# Patient Record
Sex: FEMALE | Race: WHITE | NOT HISPANIC OR LATINO
[De-identification: names, ages, dates, MRNs, and addresses within clinical notes are randomized per-mention and may not be internally consistent; named-entity substitution may affect disease eponyms.]

---

## 2021-01-06 ENCOUNTER — APPOINTMENT (OUTPATIENT)
Dept: OTOLARYNGOLOGY | Facility: CLINIC | Age: 66
End: 2021-01-06
Payer: COMMERCIAL

## 2021-01-06 VITALS
TEMPERATURE: 97.8 F | HEART RATE: 84 BPM | DIASTOLIC BLOOD PRESSURE: 47 MMHG | SYSTOLIC BLOOD PRESSURE: 99 MMHG | HEIGHT: 64 IN | OXYGEN SATURATION: 95 %

## 2021-01-06 DIAGNOSIS — Z85.3 PERSONAL HISTORY OF MALIGNANT NEOPLASM OF BREAST: ICD-10-CM

## 2021-01-06 DIAGNOSIS — Z80.9 FAMILY HISTORY OF MALIGNANT NEOPLASM, UNSPECIFIED: ICD-10-CM

## 2021-01-06 DIAGNOSIS — H61.21 IMPACTED CERUMEN, RIGHT EAR: ICD-10-CM

## 2021-01-06 DIAGNOSIS — H93.92 UNSPECIFIED DISORDER OF LEFT EAR: ICD-10-CM

## 2021-01-06 DIAGNOSIS — H91.22 SUDDEN IDIOPATHIC HEARING LOSS, LEFT EAR: ICD-10-CM

## 2021-01-06 DIAGNOSIS — Z82.49 FAMILY HISTORY OF ISCHEMIC HEART DISEASE AND OTHER DISEASES OF THE CIRCULATORY SYSTEM: ICD-10-CM

## 2021-01-06 PROCEDURE — 99072 ADDL SUPL MATRL&STAF TM PHE: CPT

## 2021-01-06 PROCEDURE — G0268 REMOVAL OF IMPACTED WAX MD: CPT

## 2021-01-06 PROCEDURE — 92550 TYMPANOMETRY & REFLEX THRESH: CPT

## 2021-01-06 PROCEDURE — 92557 COMPREHENSIVE HEARING TEST: CPT

## 2021-01-06 PROCEDURE — 99203 OFFICE O/P NEW LOW 30 MIN: CPT | Mod: 25

## 2021-01-06 NOTE — HISTORY OF PRESENT ILLNESS
[de-identified] : 66 yo woman with l continuous mild l hissing tinnitus for 3 weeks. She denies noticeable hl. She saw her pmd Dr Cárdenas - who she said told her he saw some congestion in her ear and he recommended Dymista and Allegra-D. She tried these but they did not help. One week prior she went to urgent care; they saw wax in her left ear and irrigated it and told her it was completely removed but the problem persisted. She denies vertigo. H/o lung ca. No fh relevant to cc. No inciting event or uri.

## 2021-01-06 NOTE — PHYSICAL EXAM
[de-identified] : r copious cerumen impaction removed atraumatically with suction; l normal [Midline] : trachea located in midline position [Normal] : no rashes [de-identified] : gait steady

## 2021-01-06 NOTE — PROCEDURE
[Cerumen Impaction] : Cerumen Impaction [Same] : same as the Pre Op Dx. [] : Removal of Cerumen [FreeTextEntry6] : r copious cerumen impaction removed atraumatically with suction

## 2021-01-15 ENCOUNTER — APPOINTMENT (OUTPATIENT)
Dept: OTOLARYNGOLOGY | Facility: CLINIC | Age: 66
End: 2021-01-15
Payer: COMMERCIAL

## 2021-01-15 VITALS
TEMPERATURE: 98.4 F | HEART RATE: 64 BPM | SYSTOLIC BLOOD PRESSURE: 131 MMHG | DIASTOLIC BLOOD PRESSURE: 79 MMHG | OXYGEN SATURATION: 99 %

## 2021-01-15 PROCEDURE — 99213 OFFICE O/P EST LOW 20 MIN: CPT

## 2021-01-15 PROCEDURE — 92553 AUDIOMETRY AIR & BONE: CPT

## 2021-01-15 PROCEDURE — 99072 ADDL SUPL MATRL&STAF TM PHE: CPT

## 2021-01-15 NOTE — DATA REVIEWED
[de-identified] : repeat  l minimally asymm hf hearing levels without real loss; reviewed with pt and compared with last week - no change [de-identified] : mri reviewed with pt - normal x deviated septum

## 2021-03-05 ENCOUNTER — APPOINTMENT (OUTPATIENT)
Dept: OTOLARYNGOLOGY | Facility: CLINIC | Age: 66
End: 2021-03-05
Payer: COMMERCIAL

## 2021-03-05 VITALS
TEMPERATURE: 98.4 F | SYSTOLIC BLOOD PRESSURE: 88 MMHG | HEART RATE: 63 BPM | BODY MASS INDEX: 22.2 KG/M2 | HEIGHT: 64 IN | RESPIRATION RATE: 14 BRPM | DIASTOLIC BLOOD PRESSURE: 50 MMHG | OXYGEN SATURATION: 95 % | WEIGHT: 130 LBS

## 2021-03-05 DIAGNOSIS — H90.42 SENSORINEURAL HEARING LOSS, UNILATERAL, LEFT EAR, WITH UNRESTRICTED HEARING ON THE CONTRALATERAL SIDE: ICD-10-CM

## 2021-03-05 DIAGNOSIS — H93.12 TINNITUS, LEFT EAR: ICD-10-CM

## 2021-03-05 PROCEDURE — 92550 TYMPANOMETRY & REFLEX THRESH: CPT

## 2021-03-05 PROCEDURE — 99213 OFFICE O/P EST LOW 20 MIN: CPT

## 2021-03-05 PROCEDURE — 92700 UNLISTED ORL SERVICE/PX: CPT

## 2021-03-05 PROCEDURE — 99072 ADDL SUPL MATRL&STAF TM PHE: CPT

## 2021-03-05 PROCEDURE — 92557 COMPREHENSIVE HEARING TEST: CPT

## 2021-03-05 NOTE — DATA REVIEWED
[de-identified] : l asymm hf snhl in very high freqs normal up to 8 khz and symmetric reviewed with pt [de-identified] : reviewed mri result (clear) with her

## 2021-03-05 NOTE — HISTORY OF PRESENT ILLNESS
[de-identified] : followup 66 yo woman with left high pitched continuous tinnitus unchanged from last visit in 1/2021. She denies veritgo or hl. She wished to have her hearing rechecked. It does not bother her.

## 2021-09-17 ENCOUNTER — APPOINTMENT (OUTPATIENT)
Dept: OTOLARYNGOLOGY | Facility: CLINIC | Age: 66
End: 2021-09-17

## 2022-01-03 ENCOUNTER — APPOINTMENT (OUTPATIENT)
Dept: OTOLARYNGOLOGY | Facility: CLINIC | Age: 67
End: 2022-01-03
Payer: COMMERCIAL

## 2022-01-03 VITALS
TEMPERATURE: 97.3 F | HEART RATE: 69 BPM | SYSTOLIC BLOOD PRESSURE: 98 MMHG | DIASTOLIC BLOOD PRESSURE: 63 MMHG | OXYGEN SATURATION: 99 %

## 2022-01-03 DIAGNOSIS — R51.9 HEADACHE, UNSPECIFIED: ICD-10-CM

## 2022-01-03 PROCEDURE — 99214 OFFICE O/P EST MOD 30 MIN: CPT | Mod: 25

## 2022-01-03 PROCEDURE — 31231 NASAL ENDOSCOPY DX: CPT | Mod: 52

## 2022-01-03 RX ORDER — ZOLPIDEM TARTRATE 10 MG/1
10 TABLET ORAL
Qty: 30 | Refills: 0 | Status: ACTIVE | COMMUNITY
Start: 2021-08-23

## 2022-01-03 RX ORDER — AZELASTINE HYDROCHLORIDE AND FLUTICASONE PROPIONATE 137; 50 UG/1; UG/1
137-50 SPRAY, METERED NASAL
Qty: 23 | Refills: 0 | Status: ACTIVE | COMMUNITY
Start: 2021-11-15

## 2022-01-03 RX ORDER — NAPROXEN 500 MG/1
500 TABLET ORAL
Qty: 7 | Refills: 0 | Status: ACTIVE | COMMUNITY
Start: 2021-12-08

## 2022-01-03 RX ORDER — GABAPENTIN 100 MG/1
100 CAPSULE ORAL
Qty: 90 | Refills: 0 | Status: ACTIVE | COMMUNITY
Start: 2021-12-15

## 2022-01-03 RX ORDER — CYCLOBENZAPRINE HYDROCHLORIDE 5 MG/1
5 TABLET, FILM COATED ORAL
Qty: 15 | Refills: 0 | Status: ACTIVE | COMMUNITY
Start: 2021-12-13

## 2022-01-03 NOTE — ASSESSMENT
[FreeTextEntry1] : l facial pain (malar)\par tmj vs sinusitis vs trigeminal problem - she was offered gabapentin by her md\par mri ordered\par rtc with mri\par brain mri from 1 yr ago ok\par I wished to try a course of abx - she would not allow it\par flonase, sudafed

## 2022-01-03 NOTE — PROCEDURE
[Posterior Lesion] : posterior lesion [Anterior rhinoscopy insufficient to account for symptoms] : anterior rhinoscopy insufficient to account for symptoms [Flexible Endoscope] : examined with the flexible endoscope [Normal] : the paranasal sinuses had no abnormalities [Serial Number: ___] : Serial Number: [unfilled] [FreeTextEntry6] : done to see whether there is matl draining from omu; sinus infx causing sx\par clear

## 2022-01-03 NOTE — PHYSICAL EXAM
[Nasal Endoscopy Performed] : nasal endoscopy was performed, see procedure section for findings [Midline] : trachea located in midline position [Normal] : no rashes [de-identified] : l>r

## 2022-01-03 NOTE — HISTORY OF PRESENT ILLNESS
[de-identified] : l posterior tooth pain for a month -- saw a dentist and told teeth are all ok. Tried nsaids no help gabapentin prescribed but did not want to take it. Felt like she had been punched in the cheek. She ruled out out sinuses with her pmd by his looking at her nose. feels like a toothache

## 2022-01-07 ENCOUNTER — APPOINTMENT (OUTPATIENT)
Dept: OTOLARYNGOLOGY | Facility: CLINIC | Age: 67
End: 2022-01-07
Payer: COMMERCIAL

## 2022-01-07 VITALS
TEMPERATURE: 98.8 F | HEIGHT: 63.5 IN | SYSTOLIC BLOOD PRESSURE: 106 MMHG | BODY MASS INDEX: 22.23 KG/M2 | OXYGEN SATURATION: 97 % | HEART RATE: 91 BPM | RESPIRATION RATE: 17 BRPM | DIASTOLIC BLOOD PRESSURE: 69 MMHG | WEIGHT: 127 LBS

## 2022-01-07 DIAGNOSIS — R51.9 HEADACHE, UNSPECIFIED: ICD-10-CM

## 2022-01-07 PROCEDURE — 99213 OFFICE O/P EST LOW 20 MIN: CPT

## 2022-01-07 NOTE — ASSESSMENT
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

## 2022-01-07 NOTE — HISTORY OF PRESENT ILLNESS
[de-identified] : 65 y/o F with L malar pain for about a month. She has had extensive dental work for a l posterior tooth problem by 3 dentists. She is here today for 4 day followup to review MRI. She admits to grinding her teeth and increased stress recently. No new ENT complaints at this time.

## 2022-01-07 NOTE — DATA REVIEWED
[de-identified] : MRI showed R synovial cyst on TMJ, nl age related changes, possible arachnoid cyst, images and results reviewed with pt

## 2022-02-28 NOTE — HISTORY OF PRESENT ILLNESS
Pharmacy requesting refill rx    liothyronine (CYTOMEL) 50 MCG tablet          Possible duplicate: Noemi to review recent actions on this medication    Sig: Take 1 tablet by mouth daily.    Disp:  30 tablet    Refills:  0    Start: 2/25/2022    Class: Eprescribe    Non-formulary    To pharmacy: Dose increased.    Last ordered: 1 week ago by Eliazar Chen MD Last dispensed: 2/16/2022    Rx #: [id number]    Pharmacy comment: PLEASE REFILL FOR 30 DAYS, PATIENT ONLY HAS PARTIAL REFILL LEFT AND HER BUBBLE PACKS ARE DUE TO BE FILLED 03/02       To be filled at: [id number] - [place], [place] - [address]         Patient last seen on 1/17/22 with advised 1 month follow-up, and next appt not yet made. Reminder in RF to contact the office.   Medication refilled per protocol.   [de-identified] : followup 66 yo woman with left high pitched continuous tinnitus. she also has tenderness of the auricle and tmj, no swelling. she clenches her teeth frequently. She has h/o lung cancer and new lesions were found recently which she says will be watched. She had mri and is here to review and discuss treatment. She has indicated she does not want IT steroids today and had a lot of side effects in the past from po. She is due to get her covid-19 vaccine in 3days.

## 2022-07-20 ENCOUNTER — APPOINTMENT (OUTPATIENT)
Dept: OTOLARYNGOLOGY | Facility: CLINIC | Age: 67
End: 2022-07-20

## 2022-07-20 VITALS
TEMPERATURE: 97.6 F | SYSTOLIC BLOOD PRESSURE: 91 MMHG | HEART RATE: 86 BPM | DIASTOLIC BLOOD PRESSURE: 52 MMHG | RESPIRATION RATE: 14 BRPM | OXYGEN SATURATION: 96 %

## 2022-07-20 DIAGNOSIS — H69.80 OTHER SPECIFIED DISORDERS OF EUSTACHIAN TUBE, UNSPECIFIED EAR: ICD-10-CM

## 2022-07-20 DIAGNOSIS — H91.90 UNSPECIFIED HEARING LOSS, UNSPECIFIED EAR: ICD-10-CM

## 2022-07-20 DIAGNOSIS — H92.03 OTALGIA, BILATERAL: ICD-10-CM

## 2022-07-20 PROCEDURE — 99213 OFFICE O/P EST LOW 20 MIN: CPT | Mod: 25

## 2022-07-20 PROCEDURE — 31231 NASAL ENDOSCOPY DX: CPT | Mod: 52

## 2022-07-20 NOTE — ASSESSMENT
[FreeTextEntry1] : r > l aural fullness\par h/o tmj\par rec soft diet see dentist wear mouth guard\par \par possible r sudden hl\par I strongly recommended  - she said she did not have time for it today. I recommended she come back with one soon - she said she could come back in a few weeks as she lives in [place]. I recommended she see Dr Stafford or his colleagues there in the interim is she could not come in here and asked staff to give her his contact info.

## 2022-07-20 NOTE — PHYSICAL EXAM
[de-identified] : b [de-identified] : scant cerumen removed [Nasal Endoscopy Performed] : nasal endoscopy was performed, see procedure section for findings [Normal] : no neck adenopathy [de-identified] : gait steady

## 2022-07-20 NOTE — HISTORY OF PRESENT ILLNESS
[de-identified] : 6 mo followup appt for this 65 yo F with b otalgia. She was found to have tmd on mri in January. The pain has recurred and it is bothersome. Denies fsc or drainage. Feels as if something is in ear and that hearing may be decreased in r ear. She cannot stay today for .

## 2022-07-20 NOTE — PROCEDURE
[Posterior Lesion] : posterior lesion [Anterior rhinoscopy insufficient to account for symptoms] : anterior rhinoscopy insufficient to account for symptoms [Flexible Endoscope] : examined with the flexible endoscope [Serial Number: ___] : Serial Number: [unfilled] [Normal] : the paranasal sinuses had no abnormalities [FreeTextEntry6] : done to ro npx mass with referred otalgia\par clear

## 2023-07-20 ENCOUNTER — APPOINTMENT (OUTPATIENT)
Dept: OTOLARYNGOLOGY | Facility: CLINIC | Age: 68
End: 2023-07-20
Payer: COMMERCIAL

## 2023-07-20 VITALS
WEIGHT: 127 LBS | HEIGHT: 63.5 IN | OXYGEN SATURATION: 98 % | SYSTOLIC BLOOD PRESSURE: 89 MMHG | TEMPERATURE: 98 F | DIASTOLIC BLOOD PRESSURE: 59 MMHG | HEART RATE: 72 BPM | BODY MASS INDEX: 22.23 KG/M2

## 2023-07-20 VITALS — DIASTOLIC BLOOD PRESSURE: 58 MMHG | SYSTOLIC BLOOD PRESSURE: 85 MMHG

## 2023-07-20 DIAGNOSIS — H81.11 BENIGN PAROXYSMAL VERTIGO, RIGHT EAR: ICD-10-CM

## 2023-07-20 PROCEDURE — 99213 OFFICE O/P EST LOW 20 MIN: CPT

## 2023-07-20 NOTE — ASSESSMENT
[FreeTextEntry1] : r bppv\par -pt drove to today's visit and needs to get back to [place]- recommended she hold off on Epley maneuver today\par -she has appt with local ENT Dr Stafford next week- asked her to let us know if they do not feel she has bppv; if not will schedule for further testing- if still symptomatic she agreed\par -rec no driving until dizziness resolves\par -elevate hob\par -meclizine for extreme episodes of dizziness - she has some at home \par RTC as needed

## 2023-07-20 NOTE — HISTORY OF PRESENT ILLNESS
[de-identified] : 1 year followup visit for this 68 y/o F with episode of vertigo 11 days ago. She said she was in exercise class and moved her head a certain way on the mat and it felt like the room was spinning. She was nauseated and reports the room appeared sideways. She went to the ED at [facility]. She vomited when she was there and was diagnosed with vertigo and they recommended she follow up with ENT. SHe was prescribed meclizine. Since this event she gets episodes of spinning vertigo with positional change which last for a few seconds at a time. She had COVID-19 approximately 1 month ago. Prior to this event she has had episodes of vertigo in the past when she moves her head in certain positions. She has an upcoming appointment next week with a local ENT.

## 2023-07-25 ENCOUNTER — NON-APPOINTMENT (OUTPATIENT)
Age: 68
End: 2023-07-25

## 2023-09-25 ENCOUNTER — APPOINTMENT (OUTPATIENT)
Dept: OTOLARYNGOLOGY | Facility: CLINIC | Age: 68
End: 2023-09-25
Payer: COMMERCIAL

## 2023-09-25 VITALS
TEMPERATURE: 97.8 F | WEIGHT: 127 LBS | DIASTOLIC BLOOD PRESSURE: 58 MMHG | HEIGHT: 63.5 IN | SYSTOLIC BLOOD PRESSURE: 90 MMHG | HEART RATE: 73 BPM | BODY MASS INDEX: 22.23 KG/M2 | OXYGEN SATURATION: 99 %

## 2023-09-25 DIAGNOSIS — R42 DIZZINESS AND GIDDINESS: ICD-10-CM

## 2023-09-25 PROCEDURE — 99212 OFFICE O/P EST SF 10 MIN: CPT

## 2024-05-21 ENCOUNTER — NON-APPOINTMENT (OUTPATIENT)
Age: 69
End: 2024-05-21

## 2024-05-21 ENCOUNTER — APPOINTMENT (OUTPATIENT)
Dept: OPHTHALMOLOGY | Facility: CLINIC | Age: 69
End: 2024-05-21
Payer: COMMERCIAL

## 2024-05-21 PROCEDURE — 92004 COMPRE OPH EXAM NEW PT 1/>: CPT

## 2024-06-18 ENCOUNTER — APPOINTMENT (OUTPATIENT)
Dept: OPHTHALMOLOGY | Facility: CLINIC | Age: 69
End: 2024-06-18

## 2024-06-18 PROCEDURE — 92015 DETERMINE REFRACTIVE STATE: CPT

## 2024-12-24 ENCOUNTER — APPOINTMENT (OUTPATIENT)
Dept: ENDOCRINOLOGY | Facility: CLINIC | Age: 69
End: 2024-12-24

## 2025-05-21 ENCOUNTER — APPOINTMENT (OUTPATIENT)
Dept: OPHTHALMOLOGY | Facility: CLINIC | Age: 70
End: 2025-05-21